# Patient Record
Sex: FEMALE | Race: WHITE | NOT HISPANIC OR LATINO | Employment: OTHER | URBAN - METROPOLITAN AREA
[De-identification: names, ages, dates, MRNs, and addresses within clinical notes are randomized per-mention and may not be internally consistent; named-entity substitution may affect disease eponyms.]

---

## 2017-06-02 PROBLEM — K44.9 LARGE HIATAL HERNIA: Status: ACTIVE | Noted: 2017-06-02

## 2017-10-17 PROBLEM — Z23 NEED FOR PNEUMOCOCCAL VACCINATION: Status: ACTIVE | Noted: 2017-10-17

## 2017-10-17 PROBLEM — Z23 NEED FOR IMMUNIZATION AGAINST INFLUENZA: Status: ACTIVE | Noted: 2017-10-17

## 2017-10-17 PROBLEM — R60.9 EDEMA: Status: ACTIVE | Noted: 2017-10-17

## 2018-02-28 ENCOUNTER — APPOINTMENT (OUTPATIENT)
Dept: RADIOLOGY | Facility: MEDICAL CENTER | Age: 73
End: 2018-02-28
Attending: EMERGENCY MEDICINE
Payer: MEDICARE

## 2018-02-28 ENCOUNTER — HOSPITAL ENCOUNTER (EMERGENCY)
Facility: MEDICAL CENTER | Age: 73
End: 2018-02-28
Attending: EMERGENCY MEDICINE
Payer: MEDICARE

## 2018-02-28 VITALS
DIASTOLIC BLOOD PRESSURE: 87 MMHG | RESPIRATION RATE: 16 BRPM | WEIGHT: 170 LBS | OXYGEN SATURATION: 97 % | HEART RATE: 78 BPM | TEMPERATURE: 98 F | BODY MASS INDEX: 30.12 KG/M2 | SYSTOLIC BLOOD PRESSURE: 145 MMHG | HEIGHT: 63 IN

## 2018-02-28 DIAGNOSIS — S16.1XXA STRAIN OF NECK MUSCLE, INITIAL ENCOUNTER: ICD-10-CM

## 2018-02-28 DIAGNOSIS — S00.83XA CONTUSION OF FACE, INITIAL ENCOUNTER: ICD-10-CM

## 2018-02-28 DIAGNOSIS — S80.00XA CONTUSION OF KNEE, UNSPECIFIED LATERALITY, INITIAL ENCOUNTER: ICD-10-CM

## 2018-02-28 DIAGNOSIS — S09.90XA CLOSED HEAD INJURY, INITIAL ENCOUNTER: ICD-10-CM

## 2018-02-28 DIAGNOSIS — S40.012A CONTUSION OF LEFT SHOULDER, INITIAL ENCOUNTER: ICD-10-CM

## 2018-02-28 DIAGNOSIS — S90.31XA CONTUSION OF RIGHT FOOT, INITIAL ENCOUNTER: ICD-10-CM

## 2018-02-28 PROCEDURE — 99284 EMERGENCY DEPT VISIT MOD MDM: CPT

## 2018-02-28 PROCEDURE — 73630 X-RAY EXAM OF FOOT: CPT | Mod: RT

## 2018-02-28 PROCEDURE — 73030 X-RAY EXAM OF SHOULDER: CPT | Mod: LT

## 2018-02-28 PROCEDURE — 71045 X-RAY EXAM CHEST 1 VIEW: CPT

## 2018-02-28 PROCEDURE — 72125 CT NECK SPINE W/O DYE: CPT

## 2018-02-28 PROCEDURE — 70486 CT MAXILLOFACIAL W/O DYE: CPT

## 2018-02-28 PROCEDURE — A9270 NON-COVERED ITEM OR SERVICE: HCPCS | Performed by: EMERGENCY MEDICINE

## 2018-02-28 PROCEDURE — 73564 X-RAY EXAM KNEE 4 OR MORE: CPT | Mod: LT

## 2018-02-28 PROCEDURE — 70450 CT HEAD/BRAIN W/O DYE: CPT

## 2018-02-28 PROCEDURE — 700102 HCHG RX REV CODE 250 W/ 637 OVERRIDE(OP): Performed by: EMERGENCY MEDICINE

## 2018-02-28 RX ORDER — HYDROCODONE BITARTRATE AND ACETAMINOPHEN 5; 325 MG/1; MG/1
1-2 TABLET ORAL EVERY 6 HOURS PRN
Qty: 10 TAB | Refills: 0 | Status: SHIPPED | OUTPATIENT
Start: 2018-02-28 | End: 2018-03-02

## 2018-02-28 RX ORDER — ACETAMINOPHEN 325 MG/1
650 TABLET ORAL ONCE
Status: COMPLETED | OUTPATIENT
Start: 2018-02-28 | End: 2018-02-28

## 2018-02-28 RX ADMIN — ACETAMINOPHEN 650 MG: 325 TABLET, FILM COATED ORAL at 15:20

## 2018-02-28 ASSESSMENT — LIFESTYLE VARIABLES: DO YOU DRINK ALCOHOL: NO

## 2018-02-28 NOTE — ED PROVIDER NOTES
ED Provider Note    CHIEF COMPLAINT  Chief Complaint   Patient presents with   • Knee Pain   • T-5000 FALL   • Head Pain       HPI  Prudence Derik is a 72 y.o. female with a history of asthma, arthritis, hypertension, single kidney, thyroid disease who presents with multiple complaints after a fall. The patient [place] she tripped over a fold in the carpeting falling on her left side. The patient says she struck both knees, and her face struck the floor. She had no loss of consciousness. She is complaining of a headache, pain to her left face, left neck, left shoulder, left knee, and right foot. The patient says she thinks she landed on her left knee, then fell forward and struck her face on the ground. She denies any lightheadedness, dizziness, nausea or vomiting. She said when she fell she felt something pop either in her face or neck.     REVIEW OF SYSTEMS  See HPI for further details. All other systems are negative.     PAST MEDICAL HISTORY  Past Medical History:   Diagnosis Date   • Anxiety    • Arthritis    • ASTHMA    • Edema 10/17/2017   • Essential hypertension 8/5/2015   • Hemorrhoid    • Hyperlipidemia    • Hypertension    • Need for immunization against influenza 10/17/2017   • Need for pneumococcal vaccination 10/17/2017   • Single kidney    • Thyroid disease        FAMILY HISTORY  Family History   Problem Relation Age of Onset   • Heart Disease Mother    • Stroke Mother    • Heart Failure Mother    • Cancer Father    • Lung Disease Paternal Grandfather    • Lung Disease Child        SOCIAL HISTORY  Social History     Social History   • Marital status:      Spouse name: N/A   • Number of children: N/A   • Years of education: N/A     Social History Main Topics   • Smoking status: Never Smoker   • Smokeless tobacco: Never Used   • Alcohol use No   • Drug use: No   • Sexual activity: No     Other Topics Concern   • Not on file     Social History Narrative  "  • No narrative on file       SURGICAL HISTORY  Past Surgical History:   Procedure Laterality Date   • ABDOMINAL HYSTERECTOMY TOTAL     • NEPHRECTOMY PARTIAL      hydronephrosis   • TONSILLECTOMY AND ADENOIDECTOMY         CURRENT MEDICATIONS  Home Medications    **Home medications have not yet been reviewed for this encounter**         ALLERGIES  Allergies   Allergen Reactions   • Flagyl [Metronidazole]    • Losartan Unspecified     sleepy   • Septra [Bactrim]        PHYSICAL EXAM  VITAL SIGNS: /76   Pulse 91   Temp 36.7 °C (98 °F)   Resp 16   Ht 1.6 m (5' 3\")   Wt 77.1 kg (170 lb)   SpO2 95%   BMI 30.11 kg/m²   Constitutional: Awake, alert, in no acute distress, Non-toxic appearance.   HENT: Atraumatic, No cephalhematoma. There is tenderness over the left forehead and left face. Bilateral external ears normal, mucous membranes moist, throat nonerythematous without exudates, nose is normal, nontender, no deformity. There is tenderness over the left forehead and left cheek, without swelling or deformity. No tetanus along the mandible. No loose teeth or malocclusion.  Eyes: PERRL, EOMI, conjunctiva moist, noninjected.  Neck: There is tenderness along the left neck without point tenderness to the cervical spine, Normal range of motion, No nuchal rigidity, No stridor.   Lymphatic: No lymphadenopathy noted.   Cardiovascular: Regular rate and rhythm, no murmurs, rubs, gallops.  Thorax & Lungs:  Good breath sounds bilaterally, no wheezes, rales, or retractions.  No chest tenderness.  Abdomen: Bowel sounds normal, Soft, nontender, nondistended, no rebound, guarding, masses.  Back: No CVA or spinal tenderness.  Extremities: Intact distal pulses, No edema, there is tenderness over the left knee with arthritic changes present, no joint effusion, good range of motion on flexion/extension both actively and passively. There is a developing bruise over the right knee with no tenderness. There is tenderness over the " dorsum of the right foot without swelling or deformity.  Skin: Warm, Dry, No rashes or ecchymosis developing over the knees, left greater than right..   Musculoskeletal: There is tenderness to the left shoulder without obvious deformity, no tenderness to the clavicles, shoulder, chest wall, ribs, or pelvis. Tenderness to the left knee and right foot as noted above.  Neurologic: Alert & oriented x 3, cranial nerves II through XII intact, sensory and motor function normal. No focal deficits.   Psychiatric: Affect normal, Judgment normal, Mood normal.         RADIOLOGY/PROCEDURES  CT-MAXILLOFACIAL W/O PLUS RECONS   Final Result         1.  There is no acute facial bone or orbital fracture.   2.  There is underlying chronic sinus disease.      CT-CSPINE WITHOUT PLUS RECONS   Final Result      1.  There is no acute fracture of the cervical spine.   2.  There is multilevel degenerative disc disease and bilateral facet arthropathy as discussed above in further detail without central canal stenosis.   3.  Probable old injury or prominent nutrient vascular channel on the left at the C5-6 level. Recommend correlation with the exact area of pain.   4.  There is a trace of degenerative anterolisthesis at the C4-5 and C7-T1 levels.         CT-HEAD W/O   Final Result      1.  White matter lucencies most consistent with small vessel ischemic change versus demyelination or gliosis.      2.  Otherwise, Head CT without contrast with no acute findings. No evidence of acute cerebral infarction, hemorrhage or mass lesion.      DX-SHOULDER 2+ LEFT   Final Result      1.  There is no acute fracture or malalignment of the left shoulder.   2.  There is mild degenerative change.      DX-CHEST-LIMITED (1 VIEW)   Final Result      1.  No acute cardiac or pulmonary abnormalities are identified.   2.  There is elevation of the right hemidiaphragm.   3.  There is bibasilar linear atelectasis or scarring.      DX-FOOT-COMPLETE 3+ RIGHT   Final  Result      1.  There is no acute fracture or malalignment of the right foot.   2.  There is degenerative change and hallux valgus deformity of the 1st MTP joint.      DX-KNEE COMPLETE 4+ LEFT   Final Result      1.  There is no acute fracture or malalignment of the left knee.   2.  There is moderate to severe tricompartmental osteoarthritis.            COURSE & MEDICAL DECISION MAKING  Pertinent Labs & Imaging studies reviewed. (See chart for details)  The patient presents with the above complaints. She declines any medication than Tylenol. She is not able to take ibuprofen as she has a single kidney. Multiple x-rays and CAT scans were obtained which were negative for any acute findings. Findings were discussed with the patient. She is requesting something stronger than Tylenol on discharge.  [place] no previous prescriptions over the past one year. She is low risk on the opiate risk tool.  I discussed using alternative modes of treatment for her pain, but will prescribe her a short course of Norco 5/325 #10.  Patient is told to return to the ER for any worsening pain, dizziness, vomiting, or any other problems. She is to follow with her orthopedist at the [facility] if not better in 2-3 days.    In prescribing controlled substances to this patient, I certify that I have obtained and reviewed the medical history of Bertha More. I have also made a good romi effort to obtain applicable records from other providers who have treated the patient and records did not demonstrate any increased risk of substance abuse that would prevent me from prescribing controlled substances.     I have conducted a physical exam and documented it. I have reviewed Ms. More’s prescription history as maintained by the [place] Prescription Monitoring Program.     I have assessed the patient’s risk for abuse, dependency, and addiction using the validated Opioid Risk Tool available at  https://www.[employer].com/knbchh-odoj-vnhb-ort-narcotic-abuse.     Given the above, I believe the benefits of controlled substance therapy outweigh the risks. The reasons for prescribing controlled substances include in my professional opinion, controlled substances are the only reasonable choice for this patient because unable to take ibuprofen, tylenol insufficient for pain control.. Accordingly, I have discussed the risk and benefits, treatment plan, and alternative therapies with the patient.         FINAL IMPRESSION  1. Closed head injury  2. Facial contusion  3. Cervical strain  4. Bilateral knee contusions  5. Right foot contusion  6. Left shoulder contusion/strain      Electronically signed by: Andrew Dixon, 2/28/2018 4:52 PM

## 2018-02-28 NOTE — ED NOTES
Medicated per MAR for 6/10 shoulder, knee, chest, foot pain.  Assisted into gown.   To Imaging via wheelchair.

## 2018-02-28 NOTE — ED TRIAGE NOTES
"Pt bib ems. Pt tripped over rug at the [facility]. Pt c/o left knee pain,(chronic), left shoulder pain, and head pain. Pt states \"i hit my head on the floor\". Denies LOC.   "

## 2018-03-01 ENCOUNTER — PATIENT OUTREACH (OUTPATIENT)
Dept: HEALTH INFORMATION MANAGEMENT | Facility: OTHER | Age: 73
End: 2018-03-01

## 2018-03-01 NOTE — ED NOTES
Pt called nursing to the room to look at her R knee, pt concerned now because there is a bruise on her knee.  ERP aware and is now at bedside.

## 2018-03-01 NOTE — ED NOTES
Received orders for DC. Educated regarding use of Norco for pain. Consent for narcotics obtained. Aware to f/u with ortho for unresolved pain. Taken to lobby by wheelchair. VSS.

## 2018-04-04 PROBLEM — Z23 NEED FOR PNEUMOCOCCAL VACCINATION: Status: RESOLVED | Noted: 2017-10-17 | Resolved: 2018-04-04

## 2018-04-04 PROBLEM — Z23 NEED FOR IMMUNIZATION AGAINST INFLUENZA: Status: RESOLVED | Noted: 2017-10-17 | Resolved: 2018-04-04

## 2019-04-05 PROBLEM — M17.12 PRIMARY OSTEOARTHRITIS OF LEFT KNEE: Status: ACTIVE | Noted: 2019-04-05

## 2020-06-09 PROBLEM — K56.609 INTERMITTENT SMALL BOWEL OBSTRUCTION (HCC): Status: ACTIVE | Noted: 2020-06-09

## 2021-04-26 PROBLEM — Z87.19 HISTORY OF COLONIC DIVERTICULITIS: Status: ACTIVE | Noted: 2021-04-26

## 2021-11-02 PROBLEM — K43.2 INCISIONAL HERNIA, WITHOUT OBSTRUCTION OR GANGRENE: Status: ACTIVE | Noted: 2021-11-02

## 2021-11-02 PROBLEM — K59.00 CONSTIPATION: Status: ACTIVE | Noted: 2021-11-02

## 2021-11-26 PROBLEM — M85.80 OSTEOPENIA: Status: ACTIVE | Noted: 2021-11-26

## 2021-12-13 ENCOUNTER — TELEPHONE (OUTPATIENT)
Dept: HEALTH INFORMATION MANAGEMENT | Facility: OTHER | Age: 76
End: 2021-12-13

## 2022-03-02 PROBLEM — K56.600 PARTIAL SMALL BOWEL OBSTRUCTION (HCC): Status: ACTIVE | Noted: 2022-03-02

## 2022-03-02 PROBLEM — Z78.9 PATIENT IS FULL CODE: Status: ACTIVE | Noted: 2022-03-02

## 2022-03-02 PROBLEM — K56.609 SBO (SMALL BOWEL OBSTRUCTION) (HCC): Status: ACTIVE | Noted: 2022-03-02

## 2022-03-02 PROBLEM — N17.9 ACUTE KIDNEY INJURY SUPERIMPOSED ON CKD (HCC): Status: ACTIVE | Noted: 2022-03-02

## 2022-03-02 PROBLEM — N18.9 ACUTE KIDNEY INJURY SUPERIMPOSED ON CKD (HCC): Status: ACTIVE | Noted: 2022-03-02

## 2022-03-03 PROBLEM — D75.829 HEPARIN INDUCED THROMBOCYTOPENIA (HCC): Chronic | Status: ACTIVE | Noted: 2022-03-03

## 2022-03-04 PROBLEM — N17.9 ACUTE KIDNEY INJURY SUPERIMPOSED ON CKD (HCC): Status: RESOLVED | Noted: 2022-03-02 | Resolved: 2022-03-04

## 2022-03-04 PROBLEM — N18.9 ACUTE KIDNEY INJURY SUPERIMPOSED ON CKD (HCC): Status: RESOLVED | Noted: 2022-03-02 | Resolved: 2022-03-04

## 2022-03-04 PROBLEM — K56.600 PARTIAL SMALL BOWEL OBSTRUCTION (HCC): Status: RESOLVED | Noted: 2022-03-02 | Resolved: 2022-03-04
